# Patient Record
Sex: FEMALE | NOT HISPANIC OR LATINO | URBAN - METROPOLITAN AREA
[De-identification: names, ages, dates, MRNs, and addresses within clinical notes are randomized per-mention and may not be internally consistent; named-entity substitution may affect disease eponyms.]

---

## 2017-11-09 ENCOUNTER — EMERGENCY (EMERGENCY)
Facility: HOSPITAL | Age: 32
LOS: 1 days | Discharge: HOME | End: 2017-11-09
Admitting: EMERGENCY MEDICINE
Payer: COMMERCIAL

## 2017-11-09 VITALS
TEMPERATURE: 99 F | SYSTOLIC BLOOD PRESSURE: 145 MMHG | DIASTOLIC BLOOD PRESSURE: 86 MMHG | HEART RATE: 100 BPM | RESPIRATION RATE: 18 BRPM | OXYGEN SATURATION: 97 %

## 2017-11-09 VITALS
SYSTOLIC BLOOD PRESSURE: 137 MMHG | HEART RATE: 98 BPM | DIASTOLIC BLOOD PRESSURE: 80 MMHG | HEIGHT: 67 IN | OXYGEN SATURATION: 100 % | RESPIRATION RATE: 18 BRPM | TEMPERATURE: 98 F | WEIGHT: 169.98 LBS

## 2017-11-09 DIAGNOSIS — Z3A.15 15 WEEKS GESTATION OF PREGNANCY: ICD-10-CM

## 2017-11-09 DIAGNOSIS — O99.89 OTHER SPECIFIED DISEASES AND CONDITIONS COMPLICATING PREGNANCY, CHILDBIRTH AND THE PUERPERIUM: ICD-10-CM

## 2017-11-09 DIAGNOSIS — N89.8 OTHER SPECIFIED NONINFLAMMATORY DISORDERS OF VAGINA: ICD-10-CM

## 2017-11-09 LAB
APPEARANCE UR: CLEAR — SIGNIFICANT CHANGE UP
BILIRUB UR-MCNC: NEGATIVE — SIGNIFICANT CHANGE UP
COLOR SPEC: YELLOW — SIGNIFICANT CHANGE UP
DIFF PNL FLD: NEGATIVE — SIGNIFICANT CHANGE UP
GLUCOSE UR QL: 100
KETONES UR-MCNC: NEGATIVE — SIGNIFICANT CHANGE UP
LEUKOCYTE ESTERASE UR-ACNC: (no result)
NITRITE UR-MCNC: NEGATIVE — SIGNIFICANT CHANGE UP
PH UR: 5.5 — SIGNIFICANT CHANGE UP (ref 5–8)
PROT UR-MCNC: NEGATIVE MG/DL — SIGNIFICANT CHANGE UP
SP GR SPEC: 1.01 — SIGNIFICANT CHANGE UP (ref 1–1.03)
UROBILINOGEN FLD QL: 0.2 E.U./DL — SIGNIFICANT CHANGE UP

## 2017-11-09 PROCEDURE — 99283 EMERGENCY DEPT VISIT LOW MDM: CPT

## 2017-11-09 PROCEDURE — 81001 URINALYSIS AUTO W/SCOPE: CPT

## 2017-11-09 NOTE — ED ADULT NURSE NOTE - CHPI ED SYMPTOMS NEG
no vomiting/no abdominal pain/no nausea/no discharge/no back pain/no chills/no pain/no dysuria/no fever

## 2017-11-09 NOTE — ED PROVIDER NOTE - OBJECTIVE STATEMENT
33 yo female LMP 07/23/2017, in the ER c/o clear vaginal watery discharge for the past few weeks. Pt denies any pelvic pain Pt denies fever, chills, nausea, vomiting, pelvic or abdominal pain.

## 2017-11-09 NOTE — ED ADULT NURSE NOTE - OBJECTIVE STATEMENT
Pt w no PMH  LMP 19 referred to ED c/o clear thin vaginal discharge x three days, states " it just feels wet", denies any foul smell, abdom cramping, vag bleeding, dysuria or hematuria.

## 2017-11-09 NOTE — CONSULT NOTE ADULT - SUBJECTIVE AND OBJECTIVE BOX
31 y/o  @ 15w1d with due date 2018 who presents for rule out  premature rupture of membranes. Patient feels that she has had increased clear discharge for the past few days. She has not worn any pads. She denies abnormal color or foul smell. She is sexually active and denies ejaculation inside. She denies contractions, vaginal bleeding, fevers or chills.     No medical, surgical or gyn history   Ob Hz: , spontaneous pregnancy.     PHYSICAL EXAM:   Vital Signs Last 24 Hrs  T(C): 37.2 (2017 18:45), Max: 37.2 (2017 18:45)  T(F): 98.9 (2017 18:45), Max: 98.9 (2017 18:45)  HR: 100 (2017 18:45) (98 - 100)  BP: 145/86 (2017 18:45) (137/80 - 145/86)  BP(mean): --  RR: 18 (2017 18:45) (18 - 18)  SpO2: 97% (2017 18:45) (97% - 100%)      Constitutional: Alert & Oriented x3, No acute distress, cooperative   Gastrointestinal: soft, non tender, positive bowel sounds, no rebound or guarding   Speculum exam: cervix closed, physiologic discharge, no malodor or abnormal discharge, no vaginal bleeding. Nitrazine and amniosure negative    Extremities: no calf tenderness or swelling    Urinalysis Basic - ( 2017 18:04 )    Color: Yellow / Appearance: Clear / S.010 / pH: x  Gluc: x / Ketone: NEGATIVE  / Bili: Negative / Urobili: 0.2 E.U./dL   Blood: x / Protein: NEGATIVE mg/dL / Nitrite: NEGATIVE   Leuk Esterase: Trace / RBC: < 5 /HPF / WBC < 5 /HPF   Sq Epi: x / Non Sq Epi: 0-5 /HPF / Bacteria: Present /HPF        RADIOLOGY & ADDITIONAL STUDIES: Bedside transvaginal ultrasound.  + fetal heart rate of 157bpm. Anterior placenta. + fetal movement. DVP 8x8cm.

## 2017-11-09 NOTE — CONSULT NOTE ADULT - ASSESSMENT
33 y/o @ 15w1d who presents with vaginal discharge and concern for PPROM   - no evidence of rupture. Nitrazine and amniosure negative.   - no evidence of  labor. No vaginal bleeding or abdominal cramping  - + fetal heart rate. No evidence of fetal distress  - patient will be discharged to resume normal prenatal care       Plan discussed with Dr. Estrada who agrees with the assessment and plan.

## 2017-11-09 NOTE — ED ADULT TRIAGE NOTE - CHIEF COMPLAINT QUOTE
Patient 15 weeks pregnant  c/o clear vaginal discharge for 3 days . Denies any vaginal bleeding , abdominal pain nor dysuria .

## 2017-11-09 NOTE — ED PROVIDER NOTE - MEDICAL DECISION MAKING DETAILS
31 yo female , , ISIAH 2018, in the ER c/o clear watery vaginal discharge for the past few days. No pelvic/abdominal pain, no vaginal bleeding. appears well. Pt had pelvic exam done by GYN resident on call, no PROM/amniotic fluid leakage, d/c home recommended for out pt prenatal care.

## 2018-04-16 ENCOUNTER — OUTPATIENT (OUTPATIENT)
Dept: OUTPATIENT SERVICES | Facility: HOSPITAL | Age: 33
LOS: 1 days | End: 2018-04-16
Payer: COMMERCIAL

## 2018-04-16 DIAGNOSIS — O26.899 OTHER SPECIFIED PREGNANCY RELATED CONDITIONS, UNSPECIFIED TRIMESTER: ICD-10-CM

## 2018-04-16 DIAGNOSIS — Z3A.00 WEEKS OF GESTATION OF PREGNANCY NOT SPECIFIED: ICD-10-CM

## 2018-04-17 DIAGNOSIS — O09.613 SUPERVISION OF YOUNG PRIMIGRAVIDA, THIRD TRIMESTER: ICD-10-CM

## 2018-04-20 ENCOUNTER — INPATIENT (INPATIENT)
Facility: HOSPITAL | Age: 33
LOS: 4 days | Discharge: HOME | End: 2018-04-25
Attending: OBSTETRICS & GYNECOLOGY | Admitting: OBSTETRICS & GYNECOLOGY
Payer: COMMERCIAL

## 2018-04-20 VITALS — HEIGHT: 67 IN | WEIGHT: 191.8 LBS

## 2018-04-20 DIAGNOSIS — Z3A.00 WEEKS OF GESTATION OF PREGNANCY NOT SPECIFIED: ICD-10-CM

## 2018-04-20 DIAGNOSIS — O26.899 OTHER SPECIFIED PREGNANCY RELATED CONDITIONS, UNSPECIFIED TRIMESTER: ICD-10-CM

## 2018-04-20 LAB
ALBUMIN SERPL ELPH-MCNC: 3.5 G/DL — SIGNIFICANT CHANGE UP (ref 3.3–5)
ALP SERPL-CCNC: 171 U/L — HIGH (ref 40–120)
ALT FLD-CCNC: 14 U/L — SIGNIFICANT CHANGE UP (ref 10–45)
ANION GAP SERPL CALC-SCNC: 14 MMOL/L — SIGNIFICANT CHANGE UP (ref 5–17)
APPEARANCE UR: (no result)
APTT BLD: 23.9 SEC — LOW (ref 27.5–37.4)
AST SERPL-CCNC: 20 U/L — SIGNIFICANT CHANGE UP (ref 10–40)
BILIRUB SERPL-MCNC: 0.2 MG/DL — SIGNIFICANT CHANGE UP (ref 0.2–1.2)
BILIRUB UR-MCNC: NEGATIVE — SIGNIFICANT CHANGE UP
BLD GP AB SCN SERPL QL: NEGATIVE — SIGNIFICANT CHANGE UP
BLD GP AB SCN SERPL QL: NEGATIVE — SIGNIFICANT CHANGE UP
BUN SERPL-MCNC: 7 MG/DL — SIGNIFICANT CHANGE UP (ref 7–23)
CALCIUM SERPL-MCNC: 9.6 MG/DL — SIGNIFICANT CHANGE UP (ref 8.4–10.5)
CHLORIDE SERPL-SCNC: 96 MMOL/L — SIGNIFICANT CHANGE UP (ref 96–108)
CO2 SERPL-SCNC: 20 MMOL/L — LOW (ref 22–31)
COLOR SPEC: YELLOW — SIGNIFICANT CHANGE UP
CREAT SERPL-MCNC: 0.49 MG/DL — LOW (ref 0.5–1.3)
DIFF PNL FLD: NEGATIVE — SIGNIFICANT CHANGE UP
EOSINOPHIL NFR BLD AUTO: 1 % — SIGNIFICANT CHANGE UP (ref 0–6)
FIBRINOGEN PPP-MCNC: 500 MG/DL — HIGH (ref 258–438)
GLUCOSE BLDC GLUCOMTR-MCNC: 92 MG/DL — SIGNIFICANT CHANGE UP (ref 70–99)
GLUCOSE SERPL-MCNC: 106 MG/DL — HIGH (ref 70–99)
GLUCOSE UR QL: NEGATIVE — SIGNIFICANT CHANGE UP
HCT VFR BLD CALC: 29.6 % — LOW (ref 34.5–45)
HGB BLD-MCNC: 9.3 G/DL — LOW (ref 11.5–15.5)
INR BLD: 0.95 — SIGNIFICANT CHANGE UP (ref 0.88–1.16)
KETONES UR-MCNC: NEGATIVE — SIGNIFICANT CHANGE UP
LDH SERPL L TO P-CCNC: 174 U/L — SIGNIFICANT CHANGE UP (ref 50–242)
LEUKOCYTE ESTERASE UR-ACNC: (no result)
LYMPHOCYTES # BLD AUTO: 19 % — SIGNIFICANT CHANGE UP (ref 13–44)
MCHC RBC-ENTMCNC: 17.5 PG — LOW (ref 27–34)
MCHC RBC-ENTMCNC: 31.4 G/DL — LOW (ref 32–36)
MCV RBC AUTO: 55.8 FL — LOW (ref 80–100)
MONOCYTES NFR BLD AUTO: 4 % — SIGNIFICANT CHANGE UP (ref 2–14)
NEUTROPHILS NFR BLD AUTO: 74 % — SIGNIFICANT CHANGE UP (ref 43–77)
NITRITE UR-MCNC: NEGATIVE — SIGNIFICANT CHANGE UP
PH UR: 6.5 — SIGNIFICANT CHANGE UP (ref 5–8)
PLATELET # BLD AUTO: 238 K/UL — SIGNIFICANT CHANGE UP (ref 150–400)
POTASSIUM SERPL-MCNC: 3.8 MMOL/L — SIGNIFICANT CHANGE UP (ref 3.5–5.3)
POTASSIUM SERPL-SCNC: 3.8 MMOL/L — SIGNIFICANT CHANGE UP (ref 3.5–5.3)
PROT SERPL-MCNC: 7.8 G/DL — SIGNIFICANT CHANGE UP (ref 6–8.3)
PROT UR-MCNC: NEGATIVE MG/DL — SIGNIFICANT CHANGE UP
PROTHROM AB SERPL-ACNC: 10.5 SEC — SIGNIFICANT CHANGE UP (ref 9.8–12.7)
RBC # BLD: 5.3 M/UL — HIGH (ref 3.8–5.2)
RBC # FLD: 19.3 % — HIGH (ref 10.3–16.9)
RH IG SCN BLD-IMP: POSITIVE — SIGNIFICANT CHANGE UP
RH IG SCN BLD-IMP: POSITIVE — SIGNIFICANT CHANGE UP
SODIUM SERPL-SCNC: 130 MMOL/L — LOW (ref 135–145)
SP GR SPEC: <=1.005 — SIGNIFICANT CHANGE UP (ref 1–1.03)
URATE SERPL-MCNC: 4.2 MG/DL — SIGNIFICANT CHANGE UP (ref 2.5–7)
UROBILINOGEN FLD QL: 0.2 E.U./DL — SIGNIFICANT CHANGE UP
WBC # BLD: 8.9 K/UL — SIGNIFICANT CHANGE UP (ref 3.8–10.5)
WBC # FLD AUTO: 8.9 K/UL — SIGNIFICANT CHANGE UP (ref 3.8–10.5)

## 2018-04-20 RX ORDER — SODIUM CHLORIDE 9 MG/ML
2000 INJECTION, SOLUTION INTRAVENOUS ONCE
Refills: 0 | Status: DISCONTINUED | OUTPATIENT
Start: 2018-04-20 | End: 2018-04-20

## 2018-04-20 RX ORDER — INFLUENZA VIRUS VACCINE 15; 15; 15; 15 UG/.5ML; UG/.5ML; UG/.5ML; UG/.5ML
0.5 SUSPENSION INTRAMUSCULAR ONCE
Refills: 0 | Status: DISCONTINUED | OUTPATIENT
Start: 2018-04-20 | End: 2018-04-21

## 2018-04-20 RX ORDER — SODIUM CHLORIDE 9 MG/ML
1000 INJECTION INTRAMUSCULAR; INTRAVENOUS; SUBCUTANEOUS
Refills: 0 | Status: DISCONTINUED | OUTPATIENT
Start: 2018-04-20 | End: 2018-04-21

## 2018-04-20 RX ORDER — OXYTOCIN 10 UNIT/ML
333.33 VIAL (ML) INJECTION
Qty: 20 | Refills: 0 | Status: DISCONTINUED | OUTPATIENT
Start: 2018-04-20 | End: 2018-04-21

## 2018-04-20 RX ORDER — SODIUM CHLORIDE 9 MG/ML
1000 INJECTION, SOLUTION INTRAVENOUS
Refills: 0 | Status: DISCONTINUED | OUTPATIENT
Start: 2018-04-20 | End: 2018-04-20

## 2018-04-20 RX ORDER — CITRIC ACID/SODIUM CITRATE 300-500 MG
15 SOLUTION, ORAL ORAL ONCE
Refills: 0 | Status: DISCONTINUED | OUTPATIENT
Start: 2018-04-20 | End: 2018-04-21

## 2018-04-20 RX ORDER — DINOPROSTONE 10 MG/241MG
10 INSERT VAGINAL ONCE
Refills: 0 | Status: COMPLETED | OUTPATIENT
Start: 2018-04-20 | End: 2018-04-20

## 2018-04-20 RX ADMIN — SODIUM CHLORIDE 125 MILLILITER(S): 9 INJECTION, SOLUTION INTRAVENOUS at 17:17

## 2018-04-20 RX ADMIN — DINOPROSTONE 10 MILLIGRAM(S): 10 INSERT VAGINAL at 18:26

## 2018-04-20 RX ADMIN — SODIUM CHLORIDE 125 MILLILITER(S): 9 INJECTION INTRAMUSCULAR; INTRAVENOUS; SUBCUTANEOUS at 19:33

## 2018-04-21 LAB
ALBUMIN SERPL ELPH-MCNC: 2.7 G/DL — LOW (ref 3.3–5)
ALP SERPL-CCNC: 136 U/L — HIGH (ref 40–120)
ALT FLD-CCNC: 11 U/L — SIGNIFICANT CHANGE UP (ref 10–45)
ANION GAP SERPL CALC-SCNC: 12 MMOL/L — SIGNIFICANT CHANGE UP (ref 5–17)
APTT BLD: 21.9 SEC — LOW (ref 27.5–37.4)
AST SERPL-CCNC: 20 U/L — SIGNIFICANT CHANGE UP (ref 10–40)
BASOPHILS NFR BLD AUTO: 0.1 % — SIGNIFICANT CHANGE UP (ref 0–2)
BILIRUB SERPL-MCNC: 0.4 MG/DL — SIGNIFICANT CHANGE UP (ref 0.2–1.2)
BUN SERPL-MCNC: 6 MG/DL — LOW (ref 7–23)
CALCIUM SERPL-MCNC: 8.3 MG/DL — LOW (ref 8.4–10.5)
CHLORIDE SERPL-SCNC: 104 MMOL/L — SIGNIFICANT CHANGE UP (ref 96–108)
CO2 SERPL-SCNC: 20 MMOL/L — LOW (ref 22–31)
CREAT SERPL-MCNC: 0.49 MG/DL — LOW (ref 0.5–1.3)
EOSINOPHIL NFR BLD AUTO: 0.4 % — SIGNIFICANT CHANGE UP (ref 0–6)
FIBRINOGEN PPP-MCNC: 397 MG/DL — SIGNIFICANT CHANGE UP (ref 258–438)
GLUCOSE SERPL-MCNC: 84 MG/DL — SIGNIFICANT CHANGE UP (ref 70–99)
HCT VFR BLD CALC: 28.1 % — LOW (ref 34.5–45)
HGB BLD-MCNC: 8.9 G/DL — LOW (ref 11.5–15.5)
INR BLD: 0.93 — SIGNIFICANT CHANGE UP (ref 0.88–1.16)
LYMPHOCYTES # BLD AUTO: 9.1 % — LOW (ref 13–44)
MCHC RBC-ENTMCNC: 17.6 PG — LOW (ref 27–34)
MCHC RBC-ENTMCNC: 31.7 G/DL — LOW (ref 32–36)
MCV RBC AUTO: 55.4 FL — LOW (ref 80–100)
MONOCYTES NFR BLD AUTO: 5.5 % — SIGNIFICANT CHANGE UP (ref 2–14)
NEUTROPHILS NFR BLD AUTO: 84.9 % — HIGH (ref 43–77)
PLATELET # BLD AUTO: 234 K/UL — SIGNIFICANT CHANGE UP (ref 150–400)
POTASSIUM SERPL-MCNC: 3.7 MMOL/L — SIGNIFICANT CHANGE UP (ref 3.5–5.3)
POTASSIUM SERPL-SCNC: 3.7 MMOL/L — SIGNIFICANT CHANGE UP (ref 3.5–5.3)
PROT SERPL-MCNC: 6.3 G/DL — SIGNIFICANT CHANGE UP (ref 6–8.3)
PROTHROM AB SERPL-ACNC: 10.3 SEC — SIGNIFICANT CHANGE UP (ref 9.8–12.7)
RBC # BLD: 5.07 M/UL — SIGNIFICANT CHANGE UP (ref 3.8–5.2)
RBC # FLD: 20.5 % — HIGH (ref 10.3–16.9)
SODIUM SERPL-SCNC: 136 MMOL/L — SIGNIFICANT CHANGE UP (ref 135–145)
T PALLIDUM AB TITR SER: NEGATIVE — SIGNIFICANT CHANGE UP
URATE SERPL-MCNC: 3.8 MG/DL — SIGNIFICANT CHANGE UP (ref 2.5–7)
WBC # BLD: 15 K/UL — HIGH (ref 3.8–10.5)
WBC # FLD AUTO: 15 K/UL — HIGH (ref 3.8–10.5)

## 2018-04-21 RX ORDER — ONDANSETRON 8 MG/1
4 TABLET, FILM COATED ORAL EVERY 6 HOURS
Refills: 0 | Status: DISCONTINUED | OUTPATIENT
Start: 2018-04-21 | End: 2018-04-22

## 2018-04-21 RX ORDER — HEPARIN SODIUM 5000 [USP'U]/ML
5000 INJECTION INTRAVENOUS; SUBCUTANEOUS EVERY 12 HOURS
Refills: 0 | Status: DISCONTINUED | OUTPATIENT
Start: 2018-04-21 | End: 2018-04-25

## 2018-04-21 RX ORDER — NALOXONE HYDROCHLORIDE 4 MG/.1ML
0.1 SPRAY NASAL
Refills: 0 | Status: DISCONTINUED | OUTPATIENT
Start: 2018-04-21 | End: 2018-04-22

## 2018-04-21 RX ORDER — GLYCERIN ADULT
1 SUPPOSITORY, RECTAL RECTAL AT BEDTIME
Refills: 0 | Status: DISCONTINUED | OUTPATIENT
Start: 2018-04-21 | End: 2018-04-25

## 2018-04-21 RX ORDER — MORPHINE SULFATE 50 MG/1
3 CAPSULE, EXTENDED RELEASE ORAL ONCE
Refills: 0 | Status: DISCONTINUED | OUTPATIENT
Start: 2018-04-21 | End: 2018-04-22

## 2018-04-21 RX ORDER — IBUPROFEN 200 MG
600 TABLET ORAL EVERY 6 HOURS
Refills: 0 | Status: DISCONTINUED | OUTPATIENT
Start: 2018-04-21 | End: 2018-04-23

## 2018-04-21 RX ORDER — FERROUS SULFATE 325(65) MG
325 TABLET ORAL THREE TIMES A DAY
Refills: 0 | Status: DISCONTINUED | OUTPATIENT
Start: 2018-04-21 | End: 2018-04-25

## 2018-04-21 RX ORDER — TETANUS TOXOID, REDUCED DIPHTHERIA TOXOID AND ACELLULAR PERTUSSIS VACCINE, ADSORBED 5; 2.5; 8; 8; 2.5 [IU]/.5ML; [IU]/.5ML; UG/.5ML; UG/.5ML; UG/.5ML
0.5 SUSPENSION INTRAMUSCULAR ONCE
Refills: 0 | Status: DISCONTINUED | OUTPATIENT
Start: 2018-04-21 | End: 2018-04-25

## 2018-04-21 RX ORDER — ACETAMINOPHEN 500 MG
650 TABLET ORAL EVERY 6 HOURS
Refills: 0 | Status: DISCONTINUED | OUTPATIENT
Start: 2018-04-21 | End: 2018-04-25

## 2018-04-21 RX ORDER — CEFAZOLIN SODIUM 1 G
2000 VIAL (EA) INJECTION ONCE
Refills: 0 | Status: COMPLETED | OUTPATIENT
Start: 2018-04-21 | End: 2018-04-21

## 2018-04-21 RX ORDER — CITRIC ACID/SODIUM CITRATE 300-500 MG
30 SOLUTION, ORAL ORAL ONCE
Refills: 0 | Status: COMPLETED | OUTPATIENT
Start: 2018-04-21 | End: 2018-04-21

## 2018-04-21 RX ORDER — OXYTOCIN 10 UNIT/ML
41.67 VIAL (ML) INJECTION
Qty: 20 | Refills: 0 | Status: DISCONTINUED | OUTPATIENT
Start: 2018-04-21 | End: 2018-04-25

## 2018-04-21 RX ORDER — FERROUS SULFATE 325(65) MG
325 TABLET ORAL DAILY
Refills: 0 | Status: DISCONTINUED | OUTPATIENT
Start: 2018-04-21 | End: 2018-04-21

## 2018-04-21 RX ORDER — AZITHROMYCIN 500 MG/1
500 TABLET, FILM COATED ORAL ONCE
Refills: 0 | Status: DISCONTINUED | OUTPATIENT
Start: 2018-04-21 | End: 2018-04-21

## 2018-04-21 RX ORDER — CARBOPROST TROMETHAMINE 250 UG/ML
250 INJECTION, SOLUTION INTRAMUSCULAR
Refills: 0 | Status: COMPLETED | OUTPATIENT
Start: 2018-04-21 | End: 2018-04-21

## 2018-04-21 RX ORDER — DIPHENHYDRAMINE HCL 50 MG
25 CAPSULE ORAL EVERY 6 HOURS
Refills: 0 | Status: DISCONTINUED | OUTPATIENT
Start: 2018-04-21 | End: 2018-04-25

## 2018-04-21 RX ORDER — DOCUSATE SODIUM 100 MG
100 CAPSULE ORAL
Refills: 0 | Status: DISCONTINUED | OUTPATIENT
Start: 2018-04-21 | End: 2018-04-25

## 2018-04-21 RX ORDER — SODIUM CHLORIDE 9 MG/ML
1000 INJECTION, SOLUTION INTRAVENOUS
Refills: 0 | Status: DISCONTINUED | OUTPATIENT
Start: 2018-04-21 | End: 2018-04-22

## 2018-04-21 RX ORDER — LANOLIN
1 OINTMENT (GRAM) TOPICAL
Refills: 0 | Status: DISCONTINUED | OUTPATIENT
Start: 2018-04-21 | End: 2018-04-25

## 2018-04-21 RX ORDER — OXYCODONE AND ACETAMINOPHEN 5; 325 MG/1; MG/1
2 TABLET ORAL EVERY 6 HOURS
Refills: 0 | Status: DISCONTINUED | OUTPATIENT
Start: 2018-04-21 | End: 2018-04-25

## 2018-04-21 RX ORDER — SIMETHICONE 80 MG/1
80 TABLET, CHEWABLE ORAL EVERY 4 HOURS
Refills: 0 | Status: DISCONTINUED | OUTPATIENT
Start: 2018-04-21 | End: 2018-04-25

## 2018-04-21 RX ORDER — OXYCODONE AND ACETAMINOPHEN 5; 325 MG/1; MG/1
1 TABLET ORAL
Refills: 0 | Status: DISCONTINUED | OUTPATIENT
Start: 2018-04-21 | End: 2018-04-25

## 2018-04-21 RX ADMIN — SODIUM CHLORIDE 125 MILLILITER(S): 9 INJECTION INTRAMUSCULAR; INTRAVENOUS; SUBCUTANEOUS at 01:31

## 2018-04-21 RX ADMIN — Medication 30 MILLILITER(S): at 04:55

## 2018-04-21 RX ADMIN — OXYCODONE AND ACETAMINOPHEN 1 TABLET(S): 5; 325 TABLET ORAL at 18:44

## 2018-04-21 RX ADMIN — OXYCODONE AND ACETAMINOPHEN 1 TABLET(S): 5; 325 TABLET ORAL at 19:30

## 2018-04-21 RX ADMIN — SODIUM CHLORIDE 125 MILLILITER(S): 9 INJECTION INTRAMUSCULAR; INTRAVENOUS; SUBCUTANEOUS at 02:49

## 2018-04-21 RX ADMIN — Medication 600 MILLIGRAM(S): at 10:30

## 2018-04-21 RX ADMIN — Medication 600 MILLIGRAM(S): at 16:00

## 2018-04-21 RX ADMIN — CARBOPROST TROMETHAMINE 250 MICROGRAM(S): 250 INJECTION, SOLUTION INTRAMUSCULAR at 08:00

## 2018-04-21 RX ADMIN — CARBOPROST TROMETHAMINE 250 MICROGRAM(S): 250 INJECTION, SOLUTION INTRAMUSCULAR at 07:00

## 2018-04-21 RX ADMIN — Medication 600 MILLIGRAM(S): at 10:00

## 2018-04-21 RX ADMIN — Medication 125 MILLIUNIT(S)/MIN: at 12:30

## 2018-04-21 RX ADMIN — SIMETHICONE 80 MILLIGRAM(S): 80 TABLET, CHEWABLE ORAL at 21:34

## 2018-04-21 RX ADMIN — CARBOPROST TROMETHAMINE 250 MICROGRAM(S): 250 INJECTION, SOLUTION INTRAMUSCULAR at 07:30

## 2018-04-21 RX ADMIN — Medication 100 MILLIGRAM(S): at 04:55

## 2018-04-21 RX ADMIN — Medication 600 MILLIGRAM(S): at 15:13

## 2018-04-21 RX ADMIN — SODIUM CHLORIDE 125 MILLILITER(S): 9 INJECTION INTRAMUSCULAR; INTRAVENOUS; SUBCUTANEOUS at 03:19

## 2018-04-21 NOTE — LACTATION INITIAL EVALUATION - NS LACT CON REASON FOR REQ
Met Dyad and FOB. Baby sleeping soundly in BS crib. Pt reports "the baby is latching but we also gave some formula just now because I am in a lot of gas pain and can't get comfortable to nurse him right now". Pt is BS chair but desires to get back in bed; BS RN made aware and will also bring pt Simethicone. Pt reports baby has latched x2 since birth and she feels a comfortable pull; denies pinching or pain. Reviewed normal NB behavior and expectations and discussed the possible risks of casual supplementation of formula as parents report they desire to exclusively breastfeed; verbalize understanding. Formula supplementation was fed to baby via a slow flow nipple. Reviewed bf'ing basics, position/latch, expected input/output. Baby born  @ 0537, 38.6 wks, , C/S (Vacuum assist delivery as well). x2 voids/x2 poops since birth (breast and formula), WNL. No wt loss recorded at this time. Encouraged as much S2S time with baby as possible aiming to FOD of baby when giving active feeding cues. Pt states she will hold the baby more when she feels better; I supported pt in that and BS RN aware of POC. Questions answered, parents reassured./general questions without assessment/primaparous mom

## 2018-04-21 NOTE — LACTATION INITIAL EVALUATION - PRO FEEDING PLAN INFANT OB
Pt states she desires to exclusively breastfeed once she is feeling better/breast and formula feeding

## 2018-04-21 NOTE — LACTATION INITIAL EVALUATION - PRO NIPPLE PREFERENCE OB
Slow flow nipples with paced bottle feeding should be used for any supplementation fed to baby via a bottle/preference

## 2018-04-22 LAB
HCT VFR BLD CALC: 23.2 % — LOW (ref 34.5–45)
HGB BLD-MCNC: 7.3 G/DL — LOW (ref 11.5–15.5)
LYMPHOCYTES # BLD AUTO: 5 % — LOW (ref 13–44)
MCHC RBC-ENTMCNC: 17.4 PG — LOW (ref 27–34)
MCHC RBC-ENTMCNC: 31.5 G/DL — LOW (ref 32–36)
MCV RBC AUTO: 55.4 FL — LOW (ref 80–100)
MONOCYTES NFR BLD AUTO: 3 % — SIGNIFICANT CHANGE UP (ref 2–14)
NEUTROPHILS NFR BLD AUTO: 89 % — HIGH (ref 43–77)
PLATELET # BLD AUTO: 240 K/UL — SIGNIFICANT CHANGE UP (ref 150–400)
RBC # BLD: 4.19 M/UL — SIGNIFICANT CHANGE UP (ref 3.8–5.2)
RBC # FLD: 19.5 % — HIGH (ref 10.3–16.9)
WBC # BLD: 16.6 K/UL — HIGH (ref 3.8–10.5)
WBC # FLD AUTO: 16.6 K/UL — HIGH (ref 3.8–10.5)

## 2018-04-22 RX ORDER — HYDROMORPHONE HYDROCHLORIDE 2 MG/ML
1 INJECTION INTRAMUSCULAR; INTRAVENOUS; SUBCUTANEOUS ONCE
Refills: 0 | Status: DISCONTINUED | OUTPATIENT
Start: 2018-04-22 | End: 2018-04-22

## 2018-04-22 RX ADMIN — Medication 600 MILLIGRAM(S): at 07:53

## 2018-04-22 RX ADMIN — OXYCODONE AND ACETAMINOPHEN 1 TABLET(S): 5; 325 TABLET ORAL at 11:30

## 2018-04-22 RX ADMIN — Medication 600 MILLIGRAM(S): at 00:38

## 2018-04-22 RX ADMIN — Medication 600 MILLIGRAM(S): at 14:07

## 2018-04-22 RX ADMIN — OXYCODONE AND ACETAMINOPHEN 1 TABLET(S): 5; 325 TABLET ORAL at 20:07

## 2018-04-22 RX ADMIN — HEPARIN SODIUM 5000 UNIT(S): 5000 INJECTION INTRAVENOUS; SUBCUTANEOUS at 23:42

## 2018-04-22 RX ADMIN — Medication 600 MILLIGRAM(S): at 22:16

## 2018-04-22 RX ADMIN — OXYCODONE AND ACETAMINOPHEN 1 TABLET(S): 5; 325 TABLET ORAL at 10:42

## 2018-04-22 RX ADMIN — Medication 600 MILLIGRAM(S): at 15:05

## 2018-04-22 RX ADMIN — Medication 600 MILLIGRAM(S): at 08:30

## 2018-04-22 RX ADMIN — Medication 600 MILLIGRAM(S): at 00:08

## 2018-04-22 RX ADMIN — Medication 600 MILLIGRAM(S): at 23:10

## 2018-04-22 RX ADMIN — HEPARIN SODIUM 5000 UNIT(S): 5000 INJECTION INTRAVENOUS; SUBCUTANEOUS at 00:19

## 2018-04-22 RX ADMIN — Medication 325 MILLIGRAM(S): at 22:19

## 2018-04-22 RX ADMIN — OXYCODONE AND ACETAMINOPHEN 1 TABLET(S): 5; 325 TABLET ORAL at 17:25

## 2018-04-22 RX ADMIN — HYDROMORPHONE HYDROCHLORIDE 1 MILLIGRAM(S): 2 INJECTION INTRAMUSCULAR; INTRAVENOUS; SUBCUTANEOUS at 04:00

## 2018-04-22 RX ADMIN — HYDROMORPHONE HYDROCHLORIDE 1 MILLIGRAM(S): 2 INJECTION INTRAMUSCULAR; INTRAVENOUS; SUBCUTANEOUS at 03:30

## 2018-04-22 RX ADMIN — HEPARIN SODIUM 5000 UNIT(S): 5000 INJECTION INTRAVENOUS; SUBCUTANEOUS at 12:57

## 2018-04-22 RX ADMIN — OXYCODONE AND ACETAMINOPHEN 1 TABLET(S): 5; 325 TABLET ORAL at 21:00

## 2018-04-22 RX ADMIN — OXYCODONE AND ACETAMINOPHEN 1 TABLET(S): 5; 325 TABLET ORAL at 16:26

## 2018-04-23 RX ORDER — BENZOCAINE AND MENTHOL 5; 1 G/100ML; G/100ML
1 LIQUID ORAL DAILY
Refills: 0 | Status: DISCONTINUED | OUTPATIENT
Start: 2018-04-23 | End: 2018-04-25

## 2018-04-23 RX ORDER — IBUPROFEN 200 MG
600 TABLET ORAL EVERY 6 HOURS
Refills: 0 | Status: DISCONTINUED | OUTPATIENT
Start: 2018-04-23 | End: 2018-04-25

## 2018-04-23 RX ADMIN — OXYCODONE AND ACETAMINOPHEN 1 TABLET(S): 5; 325 TABLET ORAL at 11:30

## 2018-04-23 RX ADMIN — OXYCODONE AND ACETAMINOPHEN 1 TABLET(S): 5; 325 TABLET ORAL at 01:04

## 2018-04-23 RX ADMIN — OXYCODONE AND ACETAMINOPHEN 1 TABLET(S): 5; 325 TABLET ORAL at 23:35

## 2018-04-23 RX ADMIN — Medication 600 MILLIGRAM(S): at 12:32

## 2018-04-23 RX ADMIN — Medication 600 MILLIGRAM(S): at 05:38

## 2018-04-23 RX ADMIN — SIMETHICONE 80 MILLIGRAM(S): 80 TABLET, CHEWABLE ORAL at 01:06

## 2018-04-23 RX ADMIN — Medication 600 MILLIGRAM(S): at 19:00

## 2018-04-23 RX ADMIN — OXYCODONE AND ACETAMINOPHEN 1 TABLET(S): 5; 325 TABLET ORAL at 17:00

## 2018-04-23 RX ADMIN — OXYCODONE AND ACETAMINOPHEN 1 TABLET(S): 5; 325 TABLET ORAL at 10:45

## 2018-04-23 RX ADMIN — BENZOCAINE AND MENTHOL 1 LOZENGE: 5; 1 LIQUID ORAL at 06:33

## 2018-04-23 RX ADMIN — Medication 600 MILLIGRAM(S): at 18:07

## 2018-04-23 RX ADMIN — OXYCODONE AND ACETAMINOPHEN 1 TABLET(S): 5; 325 TABLET ORAL at 01:40

## 2018-04-23 RX ADMIN — HEPARIN SODIUM 5000 UNIT(S): 5000 INJECTION INTRAVENOUS; SUBCUTANEOUS at 23:43

## 2018-04-23 RX ADMIN — OXYCODONE AND ACETAMINOPHEN 1 TABLET(S): 5; 325 TABLET ORAL at 22:52

## 2018-04-23 RX ADMIN — HEPARIN SODIUM 5000 UNIT(S): 5000 INJECTION INTRAVENOUS; SUBCUTANEOUS at 12:32

## 2018-04-23 RX ADMIN — Medication 325 MILLIGRAM(S): at 23:40

## 2018-04-23 RX ADMIN — Medication 600 MILLIGRAM(S): at 23:42

## 2018-04-23 RX ADMIN — OXYCODONE AND ACETAMINOPHEN 1 TABLET(S): 5; 325 TABLET ORAL at 16:19

## 2018-04-23 RX ADMIN — Medication 600 MILLIGRAM(S): at 13:28

## 2018-04-23 RX ADMIN — Medication 1 APPLICATION(S): at 06:32

## 2018-04-23 RX ADMIN — Medication 325 MILLIGRAM(S): at 12:32

## 2018-04-24 ENCOUNTER — TRANSCRIPTION ENCOUNTER (OUTPATIENT)
Age: 33
End: 2018-04-24

## 2018-04-24 RX ADMIN — OXYCODONE AND ACETAMINOPHEN 1 TABLET(S): 5; 325 TABLET ORAL at 06:36

## 2018-04-24 RX ADMIN — OXYCODONE AND ACETAMINOPHEN 1 TABLET(S): 5; 325 TABLET ORAL at 18:30

## 2018-04-24 RX ADMIN — Medication 325 MILLIGRAM(S): at 11:49

## 2018-04-24 RX ADMIN — Medication 600 MILLIGRAM(S): at 12:38

## 2018-04-24 RX ADMIN — OXYCODONE AND ACETAMINOPHEN 1 TABLET(S): 5; 325 TABLET ORAL at 23:00

## 2018-04-24 RX ADMIN — Medication 600 MILLIGRAM(S): at 18:10

## 2018-04-24 RX ADMIN — Medication 600 MILLIGRAM(S): at 00:30

## 2018-04-24 RX ADMIN — OXYCODONE AND ACETAMINOPHEN 1 TABLET(S): 5; 325 TABLET ORAL at 05:41

## 2018-04-24 RX ADMIN — OXYCODONE AND ACETAMINOPHEN 1 TABLET(S): 5; 325 TABLET ORAL at 22:00

## 2018-04-24 RX ADMIN — OXYCODONE AND ACETAMINOPHEN 1 TABLET(S): 5; 325 TABLET ORAL at 12:40

## 2018-04-24 RX ADMIN — OXYCODONE AND ACETAMINOPHEN 1 TABLET(S): 5; 325 TABLET ORAL at 13:30

## 2018-04-24 RX ADMIN — Medication 600 MILLIGRAM(S): at 17:18

## 2018-04-24 RX ADMIN — Medication 600 MILLIGRAM(S): at 11:48

## 2018-04-24 RX ADMIN — HEPARIN SODIUM 5000 UNIT(S): 5000 INJECTION INTRAVENOUS; SUBCUTANEOUS at 11:48

## 2018-04-24 RX ADMIN — Medication 325 MILLIGRAM(S): at 21:59

## 2018-04-24 RX ADMIN — OXYCODONE AND ACETAMINOPHEN 1 TABLET(S): 5; 325 TABLET ORAL at 19:30

## 2018-04-24 NOTE — DISCHARGE NOTE OB - CARE PLAN
Principal Discharge DX:	39 weeks gestation of pregnancy  Goal:	follow up 2 weeks  Assessment and plan of treatment:	pelvic rest 6 weeks Principal Discharge DX:	39 weeks gestation of pregnancy  Goal:	follow up 2 weeks  Assessment and plan of treatment:	pelvic rest 6 weeks. Continue to monitor blood pressure twice daily. Document blood pressures to review with obstetrician at follow-up appointment. Call obstetrician for systolic blood pressure (top number) equal to or greater than 150 AND/OR diastolic blood pressure (bottom number) equal to or greater than 100. Return to hospital for systolic blood pressure equal to or greater than 160 AND/OR diastolic blood pressure equal to or greater than 110 OR any of the following symptoms: changes in vision, headache not relieved with Tylenol, severe abdominal pain, vomiting, increased vaginal bleeding, chest pain or shortness of breath.

## 2018-04-24 NOTE — DISCHARGE NOTE OB - PLAN OF CARE
follow up 2 weeks pelvic rest 6 weeks pelvic rest 6 weeks. Continue to monitor blood pressure twice daily. Document blood pressures to review with obstetrician at follow-up appointment. Call obstetrician for systolic blood pressure (top number) equal to or greater than 150 AND/OR diastolic blood pressure (bottom number) equal to or greater than 100. Return to hospital for systolic blood pressure equal to or greater than 160 AND/OR diastolic blood pressure equal to or greater than 110 OR any of the following symptoms: changes in vision, headache not relieved with Tylenol, severe abdominal pain, vomiting, increased vaginal bleeding, chest pain or shortness of breath.

## 2018-04-24 NOTE — DISCHARGE NOTE OB - PATIENT PORTAL LINK FT
You can access the [facility] Patient Portal, offered by [facility], by registering with the following website: [email or web address]

## 2018-04-24 NOTE — DISCHARGE NOTE OB - CARE PROVIDER_API CALL
Estefanía Mendez), Obstetrics and Gynecology  [address] [place] [address]  Phone: [phone]  Fax: [phone]

## 2018-04-25 VITALS
OXYGEN SATURATION: 99 % | SYSTOLIC BLOOD PRESSURE: 143 MMHG | HEART RATE: 79 BPM | RESPIRATION RATE: 16 BRPM | TEMPERATURE: 98 F | DIASTOLIC BLOOD PRESSURE: 91 MMHG

## 2018-04-25 RX ADMIN — OXYCODONE AND ACETAMINOPHEN 1 TABLET(S): 5; 325 TABLET ORAL at 09:20

## 2018-04-25 RX ADMIN — Medication 600 MILLIGRAM(S): at 12:24

## 2018-04-25 RX ADMIN — Medication 600 MILLIGRAM(S): at 01:02

## 2018-04-25 RX ADMIN — Medication 600 MILLIGRAM(S): at 06:21

## 2018-04-25 RX ADMIN — Medication 600 MILLIGRAM(S): at 00:07

## 2018-04-25 RX ADMIN — Medication 325 MILLIGRAM(S): at 06:20

## 2018-04-25 RX ADMIN — OXYCODONE AND ACETAMINOPHEN 1 TABLET(S): 5; 325 TABLET ORAL at 08:26

## 2018-04-25 RX ADMIN — Medication 600 MILLIGRAM(S): at 11:49

## 2018-04-25 RX ADMIN — Medication 600 MILLIGRAM(S): at 07:12

## 2018-04-25 NOTE — PROGRESS NOTE ADULT - ASSESSMENT
A/P   32y  s/p c/s, POD #1, stable  -  PPH - Hb stable is 7.3; patient currently asymptomatic but will continue to closely monitor.; PO iron started  -  Pain: PO motrin, percocet  -  GI: advance to regular   -  : s/p mayfield, f/u TOV  -  DVT prophylaxis: ambulation, SCDs, SQH  -  Dispo: POD 3 or 4
A/P: 32y s/p  section, POD#2, stable  -  Pain: PO motrin q6hrs, percocets for severe pain PRN  -  Post-operatively labs: post-op Hgb 7.3, on iron TID, no symptoms of anemia, no tachycardia   - GHTN: continues to be mild to normal range postpartum, denies all toxic complaints   -  GI: tolerating regular diet, passing gas, colace PRN  -  : s/p mayfield , urinating without difficulty  -  DVT prophylaxis: encouraged increased ambulation, SCDs, SQH  -  Dispo: POD 3 or 4
A/P: 32y s/p  section, POD#3, stable  -  Pain: PO motrin q6hrs, percocets for severe pain PRN  -  Post-operatively labs: post-op Hgb 7.3, no symptoms of anemia, on PO iron TID  - GHTN: blood pressures have remained normotensive postpartum and denies all toxic complaints  -  GI: tolerating regular diet, passing gas, colace PRN  -  : s/p mayfield , urinating without difficulty  -  DVT prophylaxis: encouraged increased ambulation, SCDs, SQH  -  Dispo: POD 3 or 4
A/P: 32y s/p  section, POD#4, stable  -  Pain: PO motrin q6hrs, percocets for severe pain PRN  -  Post-operatively labs: post-op Hgb 7.3, no symptoms of anemia  -  GI: tolerating regular diet, passing gas, colace PRN  -  : s/p mayfield , urinating without difficulty  -  DVT prophylaxis: encouraged increased ambulation, SCDs, SQH  -  Dispo: POD4, d/c instructions discussed

## 2018-04-25 NOTE — PROGRESS NOTE ADULT - SUBJECTIVE AND OBJECTIVE BOX
Patient evaluated at bedside this morning, resting comfortable in bed.   She reports pain is well controlled with motrin and percocets.   She denies headache, dizziness, chest pain, palpitations, shortness of breathe, nausea, vomiting or heavy vaginal bleeding.  She has been ambulating without assistance, voiding spontaneously, passing gas, tolerating regular diet and is breastfeeding.    Physical Exam:  Vital Signs Last 24 Hrs  T(C): 36.5 (24 Apr 2018 06:14), Max: 36.7 (23 Apr 2018 14:05)  T(F): 97.7 (24 Apr 2018 06:14), Max: 98.1 (23 Apr 2018 18:01)  HR: 78 (24 Apr 2018 06:14) (74 - 79)  BP: 120/72 (24 Apr 2018 06:14) (106/75 - 126/80)  BP(mean): --  RR: 18 (24 Apr 2018 06:14) (17 - 18)  SpO2: 95% (24 Apr 2018 06:14) (95% - 100%)    GA: NAD, A+0 x 3  CV: RRR  Pulm: CTAB  Breasts: soft, nontender, no palpable masses, firm bilaterally   Abd: + BS, soft, nontender, nondistended, no rebound or guarding, incision clean, dry and intact, uterus firm at midline, 2 fb below umbilicus  : lochia WNL  Extremities: no swelling or calf tenderness
Patient evaluated at bedside this morning, resting comfortable in bed.   She reports pain is well controlled with motrin and percocets. States pain has decreased and taking less percocets. Feeling well and ready to go home today.   She denies headache, dizziness, chest pain, palpitations, shortness of breathe, nausea, vomiting or heavy vaginal bleeding.  She has been ambulating without assistance, voiding spontaneously, passing gas, tolerating regular diet and is breastfeeding.    Physical Exam:  Vital Signs Last 24 Hrs  T(C): 36.8 (25 Apr 2018 06:00), Max: 36.9 (24 Apr 2018 22:00)  T(F): 98.2 (25 Apr 2018 06:00), Max: 98.5 (24 Apr 2018 22:00)  HR: 73 (25 Apr 2018 06:28) (72 - 83)  BP: 135/86 (25 Apr 2018 06:28) (116/70 - 135/91)  BP(mean): --  RR: 18 (25 Apr 2018 06:00) (16 - 18)  SpO2: 98% (25 Apr 2018 06:28) (97% - 99%)    GA: NAD, A+0 x 3  CV: RRR  Pulm: CTAB  Breasts: soft, nontender, no palpable masses  Abd: + BS, soft, nontender, nondistended, no rebound or guarding, incision clean, dry and intact, uterus firm at midline, 2 fb below umbilicus  : lochia WNL  Extremities: no swelling or calf tenderness
Patient evaluated at bedside.   She reports pain is well controlled with OPM.  She has been ambulating without assistance, voiding spontaneously, passing gas, tolerating regular diet and is breastfeeding.    She denies HA, dizziness, CP, palpitations, SOB, n/v, or heavy vaginal bleeding.    Physical Exam:  vs reviewed  Gen: NAD  Abd: + BS, soft, nontender, nondistended, no rebound or guarding  Incision clean, dry and intact  uterus firm at midline,   fb below umbilicus  : lochia WNL  Extremities: no swelling or calf tenderness    pod4 cs stable  plan to dc home w bp check daily and close follow up and iron po
Patient evaluated at bedside.   She reports pain is well controlled with OPM.  She has been ambulating without assistance, voiding spontaneously, passing gas, tolerating regular diet and is breastfeeding.    She denies HA, dizziness, CP, palpitations, SOB, n/v, or heavy vaginal bleeding.    Physical Exam:  vss  Gen: NAD  Abd: + BS, soft, nontender, nondistended, no rebound or guarding  Incision clean, dry and intact  uterus firm at midline,   fb below umbilicus  : lochia WNL  Extremities: no swelling or calf tenderness    pod2 cs stable
pt doing well  ICU Vital Signs Last 24 Hrs  T(C): 36.7 (23 Apr 2018 18:01), Max: 36.7 (23 Apr 2018 14:05)  T(F): 98.1 (23 Apr 2018 18:01), Max: 98.1 (23 Apr 2018 18:01)  HR: 79 (23 Apr 2018 18:01) (74 - 79)  BP: 126/80 (23 Apr 2018 18:01) (106/75 - 126/80)    RR: 18 (23 Apr 2018 18:01) (17 - 18)  SpO2: 98% (23 Apr 2018 18:01) (97% - 100%)    incision c/d/i  abd soft and nd  neg calf tenderness    pod 2 -doing well  reg diet   oob
pt without complaints  ICU Vital Signs Last 24 Hrs  T(C): 36.6 (22 Apr 2018 06:07), Max: 37.7 (22 Apr 2018 02:04)  T(F): 97.9 (22 Apr 2018 06:07), Max: 99.8 (22 Apr 2018 02:04)  HR: 80 (22 Apr 2018 06:07) (80 - 96)  BP: 122/72 (22 Apr 2018 06:07) (114/78 - 149/79)  BP(mean): 88 (21 Apr 2018 13:00) (85 - 106)  RR: 16 (22 Apr 2018 06:07) (14 - 18)  SpO2: 99% (22 Apr 2018 06:07) (96% - 99%)    abdomen soft nd  incision c/d/i  neg calf tenderness                        7.3    16.6  )-----------( 240      ( 22 Apr 2018 06:00 )             23.2   pod 1 s/p c./s    oob   clears  reg with copious flatus
Patient evaluated at bedside.   She reports pain is well controlled with OPM.  She has been ambulating without assistance, voiding spontaneously, passing gas, tolerating regular diet and is breastfeeding.    She denies HA, dizziness, CP, palpitations, SOB, n/v, or heavy vaginal bleeding.    Physical Exam:  vss  Gen: NAD  Abd: + BS, soft, nontender, nondistended, no rebound or guarding  Incision clean, dry and intact  uterus firm at midline,   fb below umbilicus  : lochia WNL  Extremities: no swelling or calf tenderness    pod3 cs stable, anemia
Patient evaluated at bedside.   She reports pain is well controlled.  She denies nausea, vomiting or heavy vaginal bleeding.  She has been not been OOB, is voiding spontaneously, passing gas, and is breastfeeding. She has been NPO.    Physical Exam:  Vital Signs Last 24 Hrs  T(C): 36.6 (22 Apr 2018 06:07), Max: 37.7 (22 Apr 2018 02:04)  T(F): 97.9 (22 Apr 2018 06:07), Max: 99.8 (22 Apr 2018 02:04)  HR: 80 (22 Apr 2018 06:07) (80 - 96)  BP: 122/72 (22 Apr 2018 06:07) (114/78 - 149/79)  BP(mean): 88 (21 Apr 2018 13:00) (85 - 106)  RR: 16 (22 Apr 2018 06:07) (14 - 18)  SpO2: 99% (22 Apr 2018 06:07) (96% - 99%)    GA: NAD, A+0 x 3  Abd: + BS, soft, nontender, nondistended, no rebound or guarding, incision clean, dry and intact, pressure dressing removed, steristrips in place, uterus firm at midline, 3 fb below umbilicus  : lochia WNL  Extremities: no swelling or calf tenderness                            7.3    16.6  )-----------( 240      ( 22 Apr 2018 06:00 )             23.2     04-21    136  |  104  |  6<L>  ----------------------------<  84  3.7   |  20<L>  |  0.49<L>    Ca    8.3<L>      21 Apr 2018 08:37    TPro  6.3  /  Alb  2.7<L>  /  TBili  0.4  /  DBili  x   /  AST  20  /  ALT  11  /  AlkPhos  136<H>  04-21      PT/INR - ( 21 Apr 2018 08:37 )   PT: 10.3 sec;   INR: 0.93          PTT - ( 21 Apr 2018 08:37 )  PTT:21.9 sec
Patient evaluated at bedside this morning, resting comfortable in bed.   She reports pain is well controlled with motrin and percocets.   She denies headache, dizziness, chest pain, palpitations, shortness of breathe, nausea, vomiting or heavy vaginal bleeding.  She has been ambulating without assistance, voiding spontaneously, passing gas, tolerating regular diet and is breastfeeding.    Physical Exam:  Vital Signs Last 24 Hrs  T(C): 37.2 (22 Apr 2018 18:14), Max: 37.6 (22 Apr 2018 10:30)  T(F): 98.9 (22 Apr 2018 18:14), Max: 99.6 (22 Apr 2018 10:30)  HR: 85 (22 Apr 2018 18:14) (80 - 96)  BP: 133/76 (22 Apr 2018 18:14) (122/72 - 133/76)  BP(mean): --  RR: 18 (22 Apr 2018 18:14) (16 - 18)  SpO2: 97% (22 Apr 2018 18:14) (95% - 99%)    GA: NAD, A+0 x 3  CV: RRR  Pulm: CTAB  Breasts: soft, nontender, no palpable masses  Abd: + BS, soft, nontender, nondistended, no rebound or guarding, incision clean, dry and intact, uterus firm at midline, 2 fb below umbilicus  : lochia WNL  Extremities: no swelling or calf tenderness                             7.3    16.6  )-----------( 240      ( 22 Apr 2018 06:00 )             23.2     04-21    136  |  104  |  6<L>  ----------------------------<  84  3.7   |  20<L>  |  0.49<L>    Ca    8.3<L>      21 Apr 2018 08:37    TPro  6.3  /  Alb  2.7<L>  /  TBili  0.4  /  DBili  x   /  AST  20  /  ALT  11  /  AlkPhos  136<H>  04-21      PT/INR - ( 21 Apr 2018 08:37 )   PT: 10.3 sec;   INR: 0.93          PTT - ( 21 Apr 2018 08:37 )  PTT:21.9 sec

## 2018-04-29 DIAGNOSIS — Z34.03 ENCOUNTER FOR SUPERVISION OF NORMAL FIRST PREGNANCY, THIRD TRIMESTER: ICD-10-CM

## 2018-04-29 DIAGNOSIS — Z3A.38 38 WEEKS GESTATION OF PREGNANCY: ICD-10-CM

## 2021-05-07 PROBLEM — Z00.00 ENCOUNTER FOR PREVENTIVE HEALTH EXAMINATION: Status: ACTIVE | Noted: 2021-05-07

## 2021-05-12 ENCOUNTER — TRANSCRIPTION ENCOUNTER (OUTPATIENT)
Age: 36
End: 2021-05-12

## 2021-05-12 ENCOUNTER — APPOINTMENT (OUTPATIENT)
Dept: ANTEPARTUM | Facility: CLINIC | Age: 36
End: 2021-05-12
Payer: COMMERCIAL

## 2021-05-12 ENCOUNTER — ASOB RESULT (OUTPATIENT)
Age: 36
End: 2021-05-12

## 2021-05-12 PROCEDURE — 76813 OB US NUCHAL MEAS 1 GEST: CPT

## 2021-05-12 PROCEDURE — 99072 ADDL SUPL MATRL&STAF TM PHE: CPT

## 2021-05-13 ENCOUNTER — TRANSCRIPTION ENCOUNTER (OUTPATIENT)
Age: 36
End: 2021-05-13

## 2021-05-19 ENCOUNTER — APPOINTMENT (OUTPATIENT)
Dept: ANTEPARTUM | Facility: CLINIC | Age: 36
End: 2021-05-19
Payer: COMMERCIAL

## 2021-05-19 ENCOUNTER — ASOB RESULT (OUTPATIENT)
Age: 36
End: 2021-05-19

## 2021-05-19 PROCEDURE — 76945 ECHO GUIDE VILLUS SAMPLING: CPT

## 2021-05-19 PROCEDURE — 99072 ADDL SUPL MATRL&STAF TM PHE: CPT

## 2021-05-19 PROCEDURE — 59015 CHORION BIOPSY: CPT

## 2021-06-07 ENCOUNTER — ASOB RESULT (OUTPATIENT)
Age: 36
End: 2021-06-07

## 2021-06-07 ENCOUNTER — APPOINTMENT (OUTPATIENT)
Dept: ANTEPARTUM | Facility: CLINIC | Age: 36
End: 2021-06-07
Payer: COMMERCIAL

## 2021-06-07 PROCEDURE — 76811 OB US DETAILED SNGL FETUS: CPT

## 2021-06-07 PROCEDURE — 99072 ADDL SUPL MATRL&STAF TM PHE: CPT

## 2021-07-07 ENCOUNTER — ASOB RESULT (OUTPATIENT)
Age: 36
End: 2021-07-07

## 2021-07-07 ENCOUNTER — APPOINTMENT (OUTPATIENT)
Dept: ANTEPARTUM | Facility: CLINIC | Age: 36
End: 2021-07-07
Payer: COMMERCIAL

## 2021-07-07 PROCEDURE — 99072 ADDL SUPL MATRL&STAF TM PHE: CPT

## 2021-07-07 PROCEDURE — 76816 OB US FOLLOW-UP PER FETUS: CPT

## 2021-07-12 ENCOUNTER — APPOINTMENT (OUTPATIENT)
Dept: ANTEPARTUM | Facility: CLINIC | Age: 36
End: 2021-07-12

## 2021-07-21 ENCOUNTER — APPOINTMENT (OUTPATIENT)
Dept: ANTEPARTUM | Facility: CLINIC | Age: 36
End: 2021-07-21

## 2022-09-20 NOTE — PATIENT PROFILE OB - FUNCTIONAL LEVEL PRIOR: TRANSFERRING
Patient updated to CT results of right adrenal nodule and abdominal aorta finding (previously noted 2015). PCP to be updated regarding further follow up for patient.    Nursing, please reach out to PCP office to update of CT abdominal aorta findings, Provider not in system
(0) independent

## 2024-05-01 RX ORDER — BENZOYL PEROXIDE MICRONIZED 5.8 %
1 TOWELETTE (EA) TOPICAL
Qty: 0 | Refills: 0 | DISCHARGE